# Patient Record
Sex: MALE | Race: WHITE
[De-identification: names, ages, dates, MRNs, and addresses within clinical notes are randomized per-mention and may not be internally consistent; named-entity substitution may affect disease eponyms.]

---

## 2022-05-03 ENCOUNTER — HOSPITAL ENCOUNTER (EMERGENCY)
Dept: HOSPITAL 5 - ED | Age: 47
LOS: 1 days | Discharge: HOME | End: 2022-05-04
Payer: SELF-PAY

## 2022-05-03 DIAGNOSIS — K22.4: ICD-10-CM

## 2022-05-03 DIAGNOSIS — R06.6: Primary | ICD-10-CM

## 2022-05-03 LAB
ALBUMIN SERPL-MCNC: 5 G/DL (ref 3.9–5)
ALT SERPL-CCNC: 34 UNITS/L (ref 7–56)
BASOPHILS # (AUTO): 0 K/MM3 (ref 0–0.1)
BASOPHILS NFR BLD AUTO: 0.5 % (ref 0–1.8)
BUN SERPL-MCNC: 30 MG/DL (ref 9–20)
BUN/CREAT SERPL: 30 %
CALCIUM SERPL-MCNC: 9 MG/DL (ref 8.4–10.2)
EOSINOPHIL # BLD AUTO: 0.3 K/MM3 (ref 0–0.4)
EOSINOPHIL NFR BLD AUTO: 3.6 % (ref 0–4.3)
HCT VFR BLD CALC: 44.6 % (ref 35.5–45.6)
HEMOLYSIS INDEX: 29
HGB BLD-MCNC: 15 GM/DL (ref 11.8–15.2)
LYMPHOCYTES # BLD AUTO: 2.4 K/MM3 (ref 1.2–5.4)
LYMPHOCYTES NFR BLD AUTO: 27.7 % (ref 13.4–35)
MCHC RBC AUTO-ENTMCNC: 34 % (ref 32–34)
MCV RBC AUTO: 91 FL (ref 84–94)
MONOCYTES # (AUTO): 0.8 K/MM3 (ref 0–0.8)
MONOCYTES % (AUTO): 9.5 % (ref 0–7.3)
PLATELET # BLD: 351 K/MM3 (ref 140–440)
RBC # BLD AUTO: 4.92 M/MM3 (ref 3.65–5.03)

## 2022-05-03 PROCEDURE — 71046 X-RAY EXAM CHEST 2 VIEWS: CPT

## 2022-05-03 PROCEDURE — 36415 COLL VENOUS BLD VENIPUNCTURE: CPT

## 2022-05-03 PROCEDURE — 80053 COMPREHEN METABOLIC PANEL: CPT

## 2022-05-03 PROCEDURE — 99283 EMERGENCY DEPT VISIT LOW MDM: CPT

## 2022-05-03 PROCEDURE — 96372 THER/PROPH/DIAG INJ SC/IM: CPT

## 2022-05-03 PROCEDURE — 85025 COMPLETE CBC W/AUTO DIFF WBC: CPT

## 2022-05-03 PROCEDURE — 99284 EMERGENCY DEPT VISIT MOD MDM: CPT

## 2022-05-03 PROCEDURE — 81001 URINALYSIS AUTO W/SCOPE: CPT

## 2022-05-03 NOTE — EMERGENCY DEPARTMENT REPORT
ED General Adult HPI





- General


Chief complaint: Neuro Symptoms/Deficit


Stated complaint: HICCUPS FOR 4 DAYS


Source: patient


Mode of arrival: Ambulatory


Limitations: No Limitations, Language Barrier





- History of Present Illness


Initial comments: 





Patient is a 46-year-old  male with a history of hyperlipidemia who 

presents to the ED with complaint of acute onset persistent intractable hiccups 

for the last 4 days.  Patient states that the symptoms started after he started 

taking antibiotics for suspected colitis, which she has been taking 

metronidazole and Bactrim DS in addition to antacids.  Patient states that he 

has been taking these medications until about 2 days ago when he stopped because

of intractable hiccups.  Patient denies dizziness, syncope, fever, chills, chest

pain, nausea and vomiting, headache, hemoptysis, cough, sore throat, abdominal 

pain or diarrhea and back pain.


MD Complaint: Persistent Hiccups


-: Sudden, days(s) (4)


Location: chest


Radiation: non-radiation


Severity scale (0 -10): 0


Quality: dull


Consistency: constant


Improves with: none


Worsens with: other (speach)


Associated Symptoms: denies other symptoms.  denies: confusion, chest pain, 

cough, diaphoresis, fever/chills, headaches, loss of appetite, malaise, rash, 

seizure, shortness of breath, syncope, weakness, other


Treatments Prior to Arrival: none





- Related Data


                                    Allergies











Allergy/AdvReac Type Severity Reaction Status Date / Time


 


No Known Allergies Allergy   Unverified 22 23:08














ED Review of Systems


ROS: 


Stated complaint: HICCUPS FOR 4 DAYS


Other details as noted in HPI





Constitutional: denies: chills, fever


Eyes: denies: eye pain, eye discharge, vision change


ENT: denies: ear pain, throat pain


Respiratory: denies: cough, shortness of breath, wheezing


Cardiovascular: other (Hicupps).  denies: chest pain, palpitations


Endocrine: no symptoms reported


Gastrointestinal: denies: abdominal pain, nausea, vomiting, diarrhea


Genitourinary: denies: urgency, dysuria


Musculoskeletal: denies: back pain, joint swelling, arthralgia


Skin: denies: rash, lesions


Neurological: denies: headache, weakness, paresthesias


Psychiatric: denies: anxiety, depression


Hematological/Lymphatic: denies: easy bleeding, easy bruising





ED Past Medical Hx





- Past Medical History


Additional medical history: Hyperlipidemia





ED Physical Exam





- General


Limitations: No Limitations, Language Barrier


General appearance: alert, in no apparent distress





- Head


Head exam: Present: atraumatic, normocephalic, normal inspection





- Eye


Eye exam: Present: normal appearance, PERRL, EOMI


Pupils: Present: normal accommodation





- ENT


ENT exam: Present: normal exam, normal orophraynx, mucous membranes moist, TM's 

normal bilaterally, normal external ear exam





- Neck


Neck exam: Present: normal inspection, full ROM





- Respiratory


Respiratory exam: Present: normal lung sounds bilaterally.  Absent: respiratory 

distress, wheezes, rales, rhonchi, chest wall tenderness, accessory muscle use





- Cardiovascular


Cardiovascular Exam: Present: regular rate, normal rhythm, normal heart sounds. 

Absent: systolic murmur, diastolic murmur, rubs, gallop





- GI/Abdominal


GI/Abdominal exam: Present: soft, normal bowel sounds.  Absent: tenderness, 

guarding, rebound, hyperactive bowel sounds, hypoactive bowel sounds, 

organomegaly





- Rectal


Rectal exam: Present: deferred





- Extremities Exam


Extremities exam: Present: normal inspection, full ROM, normal capillary refill.

 Absent: tenderness





- Back Exam


Back exam: Present: normal inspection, full ROM.  Absent: tenderness, CVA 

tenderness (R), CVA tenderness (L), muscle spasm, paraspinal tenderness, 

vertebral tenderness





- Neurological Exam


Neurological exam: Present: alert, oriented X3, CN II-XII intact, normal gait, 

reflexes normal





- Psychiatric


Psychiatric exam: Present: normal affect, normal mood





- Skin


Skin exam: Present: warm, dry, intact, normal color.  Absent: rash





ED Course


                                   Vital Signs











  22





  23:00


 


Temperature 98.5 F


 


Pulse Rate 71


 


Respiratory 18





Rate 


 


Blood Pressure 113/84


 


O2 Sat by Pulse 99





Oximetry 














ED Medical Decision Making





- Lab Data


Result diagrams: 


                                 22 23:17





                                 22 23:17





- Radiology Data


Radiology results: report reviewed, image reviewed





[facility]  


                                     [address] [place] [address]  


 


                                            XRay Report   


                                               Signed  


 


Patient: JEANETH AVELAR                                                    

           MR#: [id number]  


[id number]          


: 1975                                                                

Acct:[id number]      


 


Age/Sex: 46 / M                                                                

ADM Date: 22     


 


Loc: ED       


Attending Dr:   


 


 


Ordering Physician: COBY PANDEY  


Date of Service: 22  


Procedure(s): XR chest routine 2V  


Accession Number(s): [id number]  


 


cc: COBY PANDEY   


 


Fluoro Time In Minutes:   


 


CHEST 2 VIEWS   


 


 INDICATION / CLINICAL INFORMATION: hiccups.   


 


 COMPARISON: None available.  


 


 FINDINGS:  


 


 SUPPORT DEVICES: None.  


 HEART / MEDIASTINUM: No significant abnormality.   


 LUNGS / PLEURA: No significant pulmonary or pleural abnormality. No 

pneumothorax.   


 BONES: No significant osseous abnormality.  


 ADDITIONAL FINDINGS: No significant additional findings.  


 


 IMPRESSION:  


 1. No active cardiopulmonary disease.  


 


 Signer Name: Lauro Manriquez II, MD   


 Signed: 2022 12:13 AM  


 Workstation Name: [id number]   


 


 


Transcribed By: LAUREN  


Dictated By: LAURO MANRIQUEZ II, MD  


Electronically Authenticated By: LAURO MANRIQUEZ II, MD    


Signed Date/Time: 22                                


 


 


 


DD/DT: 22                                                            

 


TD/TT:








- Medical Decision Making





This is a 46-year-old  male with a history of hyperlipidemia who 

presents to the ED with complaint of acute onset persistent intractable hiccups 

for the last 4 days.  Patient states that the symptoms started after he started 

taking antibiotics for suspected colitis, which she has been taking 

metronidazole and Bactrim DS in addition to antacids.  Patient states that he 

has been taking these medications until about 2 days ago when he stopped because

of intractable hiccups.  In the ED, patient is alert and oriented x3 and is not 

in any distress.  Patient is hemodynamically stable.  Lab test results were revi

ewed and showed BUN of 30 and AST of 44.  Chest x-ray showed no acute 

cardiopulmonary abnormalities or pneumonitis.  Patient eloped from the ED before

being treated as the plan was.





- Differential Diagnosis


Dehydration; anxiety; he; GERD; esophagitis; esophageal spasm


Critical care attestation.: 


If time is entered above; I have spent that time in minutes in the direct care 

of this critically ill patient, excluding procedure time.








ED Disposition


Clinical Impression: 


 Intractable hiccups, Dehydration, Esophageal spasm





Disposition: 07 LEFT AWOL/ELOPED


Is pt being admited?: No


Does the pt Need Aspirin: No


Condition: Undetermined


Instructions:  Dehydration, Adult, Easy-to-Read, Hiccups, Esophageal Spasm


Referrals: 


[facility] [Provider Group] - 3-5 Days


Time of Disposition: 06:49


Print Language: ENGLISH

## 2022-05-04 VITALS — SYSTOLIC BLOOD PRESSURE: 140 MMHG | DIASTOLIC BLOOD PRESSURE: 80 MMHG

## 2022-05-04 LAB
BILIRUB UR QL STRIP: (no result)
BLOOD UR QL VISUAL: (no result)
PH UR STRIP: 5 [PH] (ref 5–7)
PROT UR STRIP-MCNC: (no result) MG/DL
RBC #/AREA URNS HPF: < 1 /HPF (ref 0–6)
UROBILINOGEN UR-MCNC: < 2 MG/DL (ref ?–2)
WBC #/AREA URNS HPF: 2 /HPF (ref 0–6)

## 2022-05-04 NOTE — XRAY REPORT
CHEST 2 VIEWS 



INDICATION / CLINICAL INFORMATION: hiccups. 



COMPARISON: None available.



FINDINGS:



SUPPORT DEVICES: None.

HEART / MEDIASTINUM: No significant abnormality. 

LUNGS / PLEURA: No significant pulmonary or pleural abnormality. No pneumothorax. 

BONES: No significant osseous abnormality.

ADDITIONAL FINDINGS: No significant additional findings.



IMPRESSION:

1. No active cardiopulmonary disease.



Signer Name: Jorge A Carmen II, MD 

Signed: 5/4/2022 12:13 AM

Workstation Name: [id number]